# Patient Record
Sex: FEMALE | Race: BLACK OR AFRICAN AMERICAN | NOT HISPANIC OR LATINO
[De-identification: names, ages, dates, MRNs, and addresses within clinical notes are randomized per-mention and may not be internally consistent; named-entity substitution may affect disease eponyms.]

---

## 2022-06-08 ENCOUNTER — DASHBOARD ENCOUNTERS (OUTPATIENT)
Age: 72
End: 2022-06-08

## 2022-06-08 ENCOUNTER — OFFICE VISIT (OUTPATIENT)
Dept: URBAN - METROPOLITAN AREA CLINIC 40 | Facility: CLINIC | Age: 72
End: 2022-06-08
Payer: MEDICARE

## 2022-06-08 VITALS
WEIGHT: 213 LBS | DIASTOLIC BLOOD PRESSURE: 58 MMHG | HEIGHT: 64 IN | SYSTOLIC BLOOD PRESSURE: 118 MMHG | BODY MASS INDEX: 36.37 KG/M2 | HEART RATE: 63 BPM | TEMPERATURE: 98.2 F

## 2022-06-08 DIAGNOSIS — R91.8 PULMONARY NODULES: ICD-10-CM

## 2022-06-08 DIAGNOSIS — K57.30 DIVERTICULA OF COLON: ICD-10-CM

## 2022-06-08 DIAGNOSIS — Z85.038 PERSONAL HISTORY OF MALIGNANT NEOPLASM OF LARGE INTESTINE: ICD-10-CM

## 2022-06-08 DIAGNOSIS — Z86.010 PERSONAL HISTORY OF COLON POLYPS: ICD-10-CM

## 2022-06-08 PROCEDURE — 99213 OFFICE O/P EST LOW 20 MIN: CPT | Performed by: PHYSICIAN ASSISTANT

## 2022-06-08 RX ORDER — SODIUM, POTASSIUM,MAG SULFATES 17.5-3.13G
ONCE SOLUTION, RECONSTITUTED, ORAL ORAL
Qty: 1 KIT | Refills: 0 | OUTPATIENT
Start: 2022-06-11 | End: 2022-06-12

## 2022-06-08 RX ORDER — HYDROCHLOROTHIAZIDE 25 MG/1
1 TABLET IN THE MORNING TABLET ORAL ONCE A DAY
Status: ACTIVE | COMMUNITY

## 2022-06-08 NOTE — PHYSICAL EXAM GASTROINTESTINAL
Abdomen , Obese, soft, nontender, nondistended , no guarding or rigidity , no masses palpable , normal bowel sounds , Liver and Spleen,  no hepatosplenomegaly , liver nontender

## 2022-06-08 NOTE — HPI-TODAY'S VISIT:
Ms. Nelson is a 71-year-old white female who returns her office today to schedule surveillance colonoscopy.  Her last colonoscopy was May 16, 2018 with Dr. Díaz where a 5 mm polyp removed from the sigmoid colon, along with a large 20 mm polyp in the descending colon which was pedunculated and removed with hot snare.  To prevent bleeding after polypectomy a hemostatic clip was successfully placed.  There were also 2 sessile polyps in the transverse colon of C5 to 10 mm in size removed with snare and a 5 mm polyp in the transverse colon removed cold biopsy forceps.  An additional 2 sessile polyps in the ascending colon 4 to 5 mm in size removed with cold snare.  There was a 8 mm polyp in ascending colon removed with a hot snare and few diverticula of the left colon.  The prep was only fair.  He was recommended she complete surveillance colonoscopy in 1 years time.  She did have a CT of the chest abdomen pelvis which noted pulmonary nodules and was seen by [facility] colorectal surgery for further evaluation.  Her case was presented to the tumor board.  It was recommended she have a repeat CT of the chest, abdomen and pelvis with p.o. and IV contrast in 1 years time for surveillance.  Imaging from 2018 did not appear to give any evidence of lymph node or distant metastasis risks from her malignant colon polyp.  The malignant colon polyp was the one for arising from the descending colon.  Tubular adenomas noted in the transverse, ascending colon.  Sigmoid polyp was consistent with hyperplastic lesion. Today she has no complaints.  Denies abdominal pain.  Normal consistency stool without hematochezia or melena.  No chest pain, nausea vomiting or dysphagia but states that she has been told she has a "thyroid problem" and f/u imaging of thyroid.  She has reportedly followed up with pulmonary medicine after having a recent CT ordered by her primary medical provider.  I do see that she had a CT of the chest abdomen and pelvis with IV contrast ordered back in November 2021, completed April 22, 2022 with findings of several adrenal masses and pulmonary nodules.  The stomach and small bowel were normal as well as the colon.  Diverticulosis without diverticulitis and the appendix did appear normal.  The gallbladder was also normal.  She had a thyroid ultrasound recently with several nodules we will follow-up with the endocrinology.  She has seen Dr. Sánchez recently and smoking cessation discussed including initiation of Chantix.  The plan is to repeat CT of the chest in 6 months, October 2022 for follow-up of recent imaging.

## 2022-06-22 PROBLEM — 428283002 HISTORY OF POLYP OF COLON: Status: ACTIVE | Noted: 2022-06-08

## 2022-06-22 PROBLEM — 398050005 DIVERTICULAR DISEASE OF COLON: Status: ACTIVE | Noted: 2022-06-08

## 2022-07-26 ENCOUNTER — CLAIMS CREATED FROM THE CLAIM WINDOW (OUTPATIENT)
Dept: URBAN - METROPOLITAN AREA CLINIC 4 | Facility: CLINIC | Age: 72
End: 2022-07-26
Payer: MEDICARE

## 2022-07-26 ENCOUNTER — OFFICE VISIT (OUTPATIENT)
Dept: URBAN - METROPOLITAN AREA SURGERY CENTER 30 | Facility: SURGERY CENTER | Age: 72
End: 2022-07-26
Payer: MEDICARE

## 2022-07-26 DIAGNOSIS — K63.89 OTHER SPECIFIED DISEASES OF INTESTINE: ICD-10-CM

## 2022-07-26 DIAGNOSIS — K63.5 BENIGN COLON POLYP: ICD-10-CM

## 2022-07-26 DIAGNOSIS — Z86.010 ADENOMAS PERSONAL HISTORY OF COLONIC POLYPS: ICD-10-CM

## 2022-07-26 PROCEDURE — 88305 TISSUE EXAM BY PATHOLOGIST: CPT | Performed by: PATHOLOGY

## 2022-07-26 PROCEDURE — G8907 PT DOC NO EVENTS ON DISCHARG: HCPCS | Performed by: INTERNAL MEDICINE

## 2022-07-26 PROCEDURE — 45380 COLONOSCOPY AND BIOPSY: CPT | Performed by: INTERNAL MEDICINE

## 2022-07-26 PROCEDURE — 45385 COLONOSCOPY W/LESION REMOVAL: CPT | Performed by: INTERNAL MEDICINE
